# Patient Record
Sex: FEMALE | Race: WHITE | NOT HISPANIC OR LATINO | URBAN - METROPOLITAN AREA
[De-identification: names, ages, dates, MRNs, and addresses within clinical notes are randomized per-mention and may not be internally consistent; named-entity substitution may affect disease eponyms.]

---

## 2017-03-11 ENCOUNTER — SURGERY - HEALTHEAST (OUTPATIENT)
Dept: CARDIOLOGY | Facility: CLINIC | Age: 62
End: 2017-03-11

## 2017-03-11 ASSESSMENT — MIFFLIN-ST. JEOR: SCORE: 1170.92

## 2017-03-13 ASSESSMENT — MIFFLIN-ST. JEOR: SCORE: 1175

## 2017-03-15 ENCOUNTER — HOSPITAL ENCOUNTER (OUTPATIENT)
Dept: CT IMAGING | Facility: CLINIC | Age: 62
Discharge: HOME | End: 2017-03-15

## 2017-03-15 ENCOUNTER — RECORDS - HEALTHEAST (OUTPATIENT)
Dept: ADMINISTRATIVE | Facility: OTHER | Age: 62
End: 2017-03-15

## 2017-03-15 DIAGNOSIS — R61 NIGHT SWEATS: ICD-10-CM

## 2017-03-15 DIAGNOSIS — R06.02 SHORTNESS OF BREATH: ICD-10-CM

## 2017-03-16 ENCOUNTER — AMBULATORY - HEALTHEAST (OUTPATIENT)
Dept: CARDIAC REHAB | Facility: HOSPITAL | Age: 62
End: 2017-03-16

## 2017-03-16 DIAGNOSIS — Z95.5 STENTED CORONARY ARTERY: ICD-10-CM

## 2017-03-16 DIAGNOSIS — I21.02 ST ELEVATION MYOCARDIAL INFARCTION INVOLVING LEFT ANTERIOR DESCENDING (LAD) CORONARY ARTERY (H): ICD-10-CM

## 2017-03-24 ENCOUNTER — COMMUNICATION - HEALTHEAST (OUTPATIENT)
Dept: CARDIOLOGY | Facility: CLINIC | Age: 62
End: 2017-03-24

## 2017-03-29 ENCOUNTER — AMBULATORY - HEALTHEAST (OUTPATIENT)
Dept: CARDIOLOGY | Facility: CLINIC | Age: 62
End: 2017-03-29

## 2017-03-30 ENCOUNTER — OFFICE VISIT - HEALTHEAST (OUTPATIENT)
Dept: CARDIOLOGY | Facility: CLINIC | Age: 62
End: 2017-03-30

## 2017-03-30 DIAGNOSIS — E78.5 DYSLIPIDEMIA: ICD-10-CM

## 2017-03-30 DIAGNOSIS — I21.02 ACUTE ST ELEVATION MYOCARDIAL INFARCTION (STEMI) INVOLVING LEFT ANTERIOR DESCENDING (LAD) CORONARY ARTERY (H): ICD-10-CM

## 2017-03-30 DIAGNOSIS — I21.02 ST ELEVATION MYOCARDIAL INFARCTION INVOLVING LEFT ANTERIOR DESCENDING (LAD) CORONARY ARTERY (H): ICD-10-CM

## 2017-03-30 DIAGNOSIS — I10 BENIGN ESSENTIAL HTN: ICD-10-CM

## 2017-03-30 DIAGNOSIS — I25.10 CORONARY ARTERY DISEASE INVOLVING NATIVE CORONARY ARTERY OF NATIVE HEART WITHOUT ANGINA PECTORIS: ICD-10-CM

## 2017-03-30 ASSESSMENT — MIFFLIN-ST. JEOR: SCORE: 1171.82

## 2017-04-11 ENCOUNTER — COMMUNICATION - HEALTHEAST (OUTPATIENT)
Dept: CARDIOLOGY | Facility: CLINIC | Age: 62
End: 2017-04-11

## 2017-04-24 ENCOUNTER — COMMUNICATION - HEALTHEAST (OUTPATIENT)
Dept: CARDIOLOGY | Facility: CLINIC | Age: 62
End: 2017-04-24

## 2017-04-27 ENCOUNTER — OFFICE VISIT - HEALTHEAST (OUTPATIENT)
Dept: CARDIOLOGY | Facility: CLINIC | Age: 62
End: 2017-04-27

## 2017-04-27 DIAGNOSIS — I10 ESSENTIAL HYPERTENSION: ICD-10-CM

## 2017-04-27 DIAGNOSIS — I25.10 CORONARY ARTERY DISEASE INVOLVING NATIVE CORONARY ARTERY OF NATIVE HEART WITHOUT ANGINA PECTORIS: ICD-10-CM

## 2017-04-27 DIAGNOSIS — E78.5 DYSLIPIDEMIA: ICD-10-CM

## 2017-04-27 ASSESSMENT — MIFFLIN-ST. JEOR: SCORE: 1170.92

## 2017-06-05 ENCOUNTER — COMMUNICATION - HEALTHEAST (OUTPATIENT)
Dept: CARDIOLOGY | Facility: CLINIC | Age: 62
End: 2017-06-05

## 2017-06-09 ENCOUNTER — COMMUNICATION - HEALTHEAST (OUTPATIENT)
Dept: CARDIOLOGY | Facility: CLINIC | Age: 62
End: 2017-06-09

## 2017-06-09 DIAGNOSIS — I25.10 CAD (CORONARY ARTERY DISEASE): ICD-10-CM

## 2017-07-27 ENCOUNTER — COMMUNICATION - HEALTHEAST (OUTPATIENT)
Dept: CARDIOLOGY | Facility: CLINIC | Age: 62
End: 2017-07-27

## 2017-07-27 ENCOUNTER — AMBULATORY - HEALTHEAST (OUTPATIENT)
Dept: NURSING | Facility: CLINIC | Age: 62
End: 2017-07-27

## 2017-07-27 DIAGNOSIS — I10 ESSENTIAL HYPERTENSION: ICD-10-CM

## 2017-09-12 ENCOUNTER — AMBULATORY - HEALTHEAST (OUTPATIENT)
Dept: CARDIOLOGY | Facility: CLINIC | Age: 62
End: 2017-09-12

## 2017-09-14 ENCOUNTER — OFFICE VISIT - HEALTHEAST (OUTPATIENT)
Dept: CARDIOLOGY | Facility: CLINIC | Age: 62
End: 2017-09-14

## 2017-09-14 DIAGNOSIS — E78.5 DYSLIPIDEMIA: ICD-10-CM

## 2017-09-14 DIAGNOSIS — I25.10 CORONARY ARTERY DISEASE INVOLVING NATIVE CORONARY ARTERY OF NATIVE HEART WITHOUT ANGINA PECTORIS: ICD-10-CM

## 2017-09-14 DIAGNOSIS — I10 ESSENTIAL HYPERTENSION: ICD-10-CM

## 2017-10-26 ENCOUNTER — COMMUNICATION - HEALTHEAST (OUTPATIENT)
Dept: FAMILY MEDICINE | Facility: CLINIC | Age: 62
End: 2017-10-26

## 2017-11-01 ENCOUNTER — AMBULATORY - HEALTHEAST (OUTPATIENT)
Dept: LAB | Facility: HOSPITAL | Age: 62
End: 2017-11-01

## 2017-11-01 DIAGNOSIS — E78.5 DYSLIPIDEMIA: ICD-10-CM

## 2017-11-01 LAB
CHOLEST SERPL-MCNC: 185 MG/DL
FASTING STATUS PATIENT QL REPORTED: YES
HDLC SERPL-MCNC: 43 MG/DL
LDLC SERPL CALC-MCNC: 114 MG/DL
TRIGL SERPL-MCNC: 138 MG/DL

## 2017-11-06 ASSESSMENT — MIFFLIN-ST. JEOR: SCORE: 1188.15

## 2017-11-09 ENCOUNTER — SURGERY - HEALTHEAST (OUTPATIENT)
Dept: SURGERY | Facility: CLINIC | Age: 62
End: 2017-11-09

## 2017-11-09 ENCOUNTER — ANESTHESIA - HEALTHEAST (OUTPATIENT)
Dept: SURGERY | Facility: CLINIC | Age: 62
End: 2017-11-09

## 2017-11-09 ASSESSMENT — MIFFLIN-ST. JEOR: SCORE: 1159.01

## 2018-06-04 ENCOUNTER — COMMUNICATION - HEALTHEAST (OUTPATIENT)
Dept: CARDIOLOGY | Facility: CLINIC | Age: 63
End: 2018-06-04

## 2018-06-04 DIAGNOSIS — E78.5 DYSLIPIDEMIA: ICD-10-CM

## 2018-06-04 DIAGNOSIS — I25.10 CORONARY ARTERY DISEASE INVOLVING NATIVE CORONARY ARTERY OF NATIVE HEART WITHOUT ANGINA PECTORIS: ICD-10-CM

## 2018-06-04 DIAGNOSIS — I21.02 ST ELEVATION MYOCARDIAL INFARCTION INVOLVING LEFT ANTERIOR DESCENDING (LAD) CORONARY ARTERY (H): ICD-10-CM

## 2018-09-20 ENCOUNTER — COMMUNICATION - HEALTHEAST (OUTPATIENT)
Dept: ADMINISTRATIVE | Facility: CLINIC | Age: 63
End: 2018-09-20

## 2018-10-09 ENCOUNTER — RECORDS - HEALTHEAST (OUTPATIENT)
Dept: LAB | Facility: CLINIC | Age: 63
End: 2018-10-09

## 2018-10-09 LAB
ALBUMIN SERPL-MCNC: 3.7 G/DL (ref 3.5–5)
ALP SERPL-CCNC: 66 U/L (ref 45–120)
ALT SERPL W P-5'-P-CCNC: 31 U/L (ref 0–45)
ANION GAP SERPL CALCULATED.3IONS-SCNC: 6 MMOL/L (ref 5–18)
AST SERPL W P-5'-P-CCNC: 22 U/L (ref 0–40)
BILIRUB SERPL-MCNC: 0.6 MG/DL (ref 0–1)
BUN SERPL-MCNC: 12 MG/DL (ref 8–22)
CALCIUM SERPL-MCNC: 9.3 MG/DL (ref 8.5–10.5)
CHLORIDE BLD-SCNC: 108 MMOL/L (ref 98–107)
CHOLEST SERPL-MCNC: 143 MG/DL
CO2 SERPL-SCNC: 25 MMOL/L (ref 22–31)
CREAT SERPL-MCNC: 0.64 MG/DL (ref 0.6–1.1)
FASTING STATUS PATIENT QL REPORTED: NO
GFR SERPL CREATININE-BSD FRML MDRD: >60 ML/MIN/1.73M2
GLUCOSE BLD-MCNC: 85 MG/DL (ref 70–125)
HDLC SERPL-MCNC: 47 MG/DL
LDLC SERPL CALC-MCNC: 75 MG/DL
POTASSIUM BLD-SCNC: 4.7 MMOL/L (ref 3.5–5)
PROT SERPL-MCNC: 6.5 G/DL (ref 6–8)
SODIUM SERPL-SCNC: 139 MMOL/L (ref 136–145)
TRIGL SERPL-MCNC: 104 MG/DL

## 2018-11-26 ENCOUNTER — COMMUNICATION - HEALTHEAST (OUTPATIENT)
Dept: ADMINISTRATIVE | Facility: CLINIC | Age: 63
End: 2018-11-26

## 2019-04-03 ENCOUNTER — RECORDS - HEALTHEAST (OUTPATIENT)
Dept: LAB | Facility: CLINIC | Age: 64
End: 2019-04-03

## 2019-04-03 LAB
CHOLEST SERPL-MCNC: 125 MG/DL
FASTING STATUS PATIENT QL REPORTED: NO
HDLC SERPL-MCNC: 48 MG/DL
LDLC SERPL CALC-MCNC: 63 MG/DL
TRIGL SERPL-MCNC: 70 MG/DL

## 2019-07-01 ENCOUNTER — RECORDS - HEALTHEAST (OUTPATIENT)
Dept: LAB | Facility: CLINIC | Age: 64
End: 2019-07-01

## 2019-07-01 LAB
CHOLEST SERPL-MCNC: 193 MG/DL
FASTING STATUS PATIENT QL REPORTED: YES
HDLC SERPL-MCNC: 47 MG/DL
LDLC SERPL CALC-MCNC: 122 MG/DL
TRIGL SERPL-MCNC: 118 MG/DL

## 2019-07-02 ENCOUNTER — RECORDS - HEALTHEAST (OUTPATIENT)
Dept: LAB | Facility: CLINIC | Age: 64
End: 2019-07-02

## 2019-07-02 LAB
ANION GAP SERPL CALCULATED.3IONS-SCNC: 6 MMOL/L (ref 5–18)
BUN SERPL-MCNC: 12 MG/DL (ref 8–22)
CALCIUM SERPL-MCNC: 9.2 MG/DL (ref 8.5–10.5)
CHLORIDE BLD-SCNC: 108 MMOL/L (ref 98–107)
CO2 SERPL-SCNC: 27 MMOL/L (ref 22–31)
CREAT SERPL-MCNC: 0.67 MG/DL (ref 0.6–1.1)
GFR SERPL CREATININE-BSD FRML MDRD: >60 ML/MIN/1.73M2
GLUCOSE BLD-MCNC: 79 MG/DL (ref 70–125)
POTASSIUM BLD-SCNC: 4.5 MMOL/L (ref 3.5–5)
SODIUM SERPL-SCNC: 141 MMOL/L (ref 136–145)

## 2019-09-23 ENCOUNTER — RECORDS - HEALTHEAST (OUTPATIENT)
Dept: LAB | Facility: CLINIC | Age: 64
End: 2019-09-23

## 2019-09-23 LAB
CHOLEST SERPL-MCNC: 163 MG/DL
FASTING STATUS PATIENT QL REPORTED: YES
HDLC SERPL-MCNC: 51 MG/DL
LDLC SERPL CALC-MCNC: 90 MG/DL
TRIGL SERPL-MCNC: 112 MG/DL

## 2021-05-25 ENCOUNTER — RECORDS - HEALTHEAST (OUTPATIENT)
Dept: ADMINISTRATIVE | Facility: CLINIC | Age: 66
End: 2021-05-25

## 2021-05-26 ENCOUNTER — RECORDS - HEALTHEAST (OUTPATIENT)
Dept: ADMINISTRATIVE | Facility: CLINIC | Age: 66
End: 2021-05-26

## 2021-05-29 ENCOUNTER — RECORDS - HEALTHEAST (OUTPATIENT)
Dept: ADMINISTRATIVE | Facility: CLINIC | Age: 66
End: 2021-05-29

## 2021-05-30 ENCOUNTER — RECORDS - HEALTHEAST (OUTPATIENT)
Dept: ADMINISTRATIVE | Facility: CLINIC | Age: 66
End: 2021-05-30

## 2021-05-30 VITALS — WEIGHT: 140 LBS | BODY MASS INDEX: 23.3 KG/M2

## 2021-05-30 VITALS — WEIGHT: 140.9 LBS | HEIGHT: 65 IN | BODY MASS INDEX: 23.47 KG/M2

## 2021-05-30 VITALS — WEIGHT: 140.2 LBS | BODY MASS INDEX: 23.36 KG/M2 | HEIGHT: 65 IN

## 2021-05-30 VITALS — BODY MASS INDEX: 23.32 KG/M2 | HEIGHT: 65 IN | WEIGHT: 140 LBS

## 2021-05-31 VITALS — WEIGHT: 141 LBS | BODY MASS INDEX: 22.76 KG/M2

## 2021-05-31 VITALS — BODY MASS INDEX: 22.89 KG/M2 | WEIGHT: 137.38 LBS | HEIGHT: 65 IN

## 2021-06-09 NOTE — PROGRESS NOTES
Assessment/Plan:     1.  Coronary artery disease: She was hospitalized March 11 - March 13 with a STEMI.  Coronary angiogram showed 100% thrombotic lesion in mid LAD and moderate coronary artery disease elsewhere.  She received a drug-eluting stent to mid LAD.  Dual antiplatelet therapy is being used with aspirin indefinitely and ticagrelor for 1 year. We discussed the importance of antiplatelet therapy and talking with her cardiologist prior to stopping these medications for any reason.      Risk factor modification and lifestyle management topics were discussed including managing comorbidities, heart healthy diet and exercise.  We discussed the importance of cardiac rehab following MI and she will reconsider participating.    2.  Dyslipidemia: Marah Perkins is on atorvastatin 40 mg daily.  Her LDL is 95.  She states that she is sensitive to medications so will not increase Lipitor at this time.  If her LDL remains above 70, would recommend increasing Lipitor to 80 mg daily.  We discussed a diet low in saturated fat, weight loss, and exercise along with medication for better control of cholesterol.     3.  Hypertension: Her blood pressure is controlled today taking metoprolol succinate 25 mg daily.    Marah prefers to follow-up at the [facility] so will change cardiologists.    Subjective:     Marah Perkins is seen at [facility] for post coronary intervention follow up.  She was hospitalized March 11 - March 13 with a STEMI.  Coronary angiogram showed 100% thrombotic lesion in mid LAD and moderate coronary artery disease elsewhere.  She received a drug-eluting stent to mid LAD.  Dual antiplatelet therapy is being used with aspirin indefinitely and ticagrelor for 1 year.  Echocardiogram on March 11, 2017 showed an ejection fraction of 56%, mild to moderate tricuspid regurgitation, and mild to moderate mitral regurgitation.    She has occasional shortness of breath with activity.  She has mild  fatigue.  She denies lightheadedness, orthopnea, chest pain and lower extremity edema.  She is concerned about easy bruising.  She has a rectocele that was bleeding 2 weeks ago but has now improved.    She is walking and taking care of her horses most days denies any symptoms.  She states that cardiac rehab is not covered by her insurance.    Review of Systems:   General: WNL  Eyes: WNL  Ears/Nose/Throat: WNL  Lungs: Shortness of Breath  Heart: WNL  Stomach: WNL  Bladder: WNL  Muscle/Joints: WNL  Skin: Poor Wound Healing  Nervous System: WNL  Mental Health: Anxiety     Blood: Easy Bleeding, Easy Bruising     Patient Active Problem List   Diagnosis     Acute ST elevation myocardial infarction (STEMI) involving left anterior descending (LAD) coronary artery     Benign essential HTN     Coronary artery disease involving native coronary artery of native heart without angina pectoris     Dyslipidemia       No past medical history on file.    Past Surgical History:   Procedure Laterality Date     MA CATH PLACEMENT & NJX CORONARY ART ANGIO IMG S&I N/A 3/11/2017    Procedure: Coronary Angiogram;  Surgeon: Wes Zuluaga MD;  Location: [facility] Cath Lab;  Service: Cardiology     MA L HRT CATH W/NJX L VENTRICULOGRAPHY IMG S&I N/A 3/11/2017    Procedure: Left Heart Catheterization with Left Ventriculogram;  Surgeon: Wes Zuluaga MD;  Location: [facility] Cath Lab;  Service: Cardiology       No family history on file.    Social History     Social History     Marital status:      Spouse name: N/A     Number of children: N/A     Years of education: N/A     Occupational History     Not on file.     Social History Main Topics     Smoking status: Former Smoker     Smokeless tobacco: Not on file     Alcohol use No     Drug use: No     Sexual activity: Yes     Other Topics Concern     Not on file     Social History Narrative       Current Outpatient Prescriptions   Medication Sig Dispense Refill     aspirin 81 mg  chewable tablet Chew 1 tablet (81 mg total) daily.  0     atorvastatin (LIPITOR) 40 MG tablet Take 1 tablet (40 mg total) by mouth daily. 90 tablet 3     metoprolol succinate (TOPROL-XL) 25 MG Take 1 tablet (25 mg total) by mouth daily. 90 tablet 3     multivitamin therapeutic (THERAGRAN) tablet Take 1 tablet by mouth daily.       nitroglycerin (NITROSTAT) 0.4 MG SL tablet Place 1 tablet (0.4 mg total) under the tongue every 5 (five) minutes as needed for chest pain. 90 tablet 0     ticagrelor (BRILINTA) 90 mg Tab Take 1 tablet (90 mg total) by mouth 2 (two) times a day. 180 tablet 3     traMADol (ULTRAM) 50 mg tablet Take 50 mg by mouth every 6 (six) hours as needed for pain.       No current facility-administered medications for this visit.        Allergies   Allergen Reactions     Nitrofurantoin Macrocrystal Nausea Only     Sulfa (Sulfonamide Antibiotics) Rash     Mafenide Unknown       Objective:     Vitals:    03/30/17 1449   BP: 130/70   Pulse: 68   Temp: 98.1  F (36.7  C)   SpO2: 99%     Body mass index is 23.33 kg/(m^2).      General Appearance:   Alert, cooperative and in no acute distress.   HEENT:  No scleral icterus; the mucous membranes were pink and moist.   Chest: The spine was straight. The chest was symmetric.   Lungs:   Respirations unlabored; the lungs are clear to auscultation.   Cardiovascular:   Regular rhythm. S1 and S2 without murmur, clicks or rubs. Carotid pulses are intact and symmetrical.  No carotid bruits noted.   Abdomen:  Soft, nontender, nondistended, bowel sounds present   Extremities: No cyanosis, clubbing, or edema.   Skin: No xanthelasma.   Neurologic: Mood and affect are appropriate.   Puncture site:  she reports right femoral puncture site soft with no bruising Radial pulses and Pedal pulses intact and symmetrical.  CMS intact.         Lab Review   Lab Results   Component Value Date    CREATININE 0.66 03/13/2017    BUN 10 03/12/2017     03/12/2017    K 4.2 03/13/2017      (H) 03/12/2017    CO2 25 03/12/2017     Creatinine (mg/dL)   Date Value   03/13/2017 0.66   03/12/2017 0.64   03/11/2017 0.67   03/11/2017 0.73       Cardiographics  Echocardiogram 3/11/17:    1.Left ventricle ejection fraction is normal. The calculated left ventricular ejection fraction is 56%. Definite hypokinesis seen involving the distal septum and anterior apical wall.    2.Mild to moderate tricuspid and mild to moderate mitral regurgitation.    3.Mild pulmonary hypertension suggested.    4.No previous study for comparison.    40 minutes were spent with the patient with greater than 50% spent on education and counseling.      Yamilet Prieto, [facility]

## 2021-06-09 NOTE — PROGRESS NOTES
Cardiac Rehab Discharge Note - Pt attended initial session only. Pt was to call to set up a schedule and we had not heard from her in over a month with multiple attempts to contact. Pt completed a 6 minute walk on the first session at a 2.8 Met level without any symptoms. We will be discharging her from our care today (4.7.17).

## 2021-06-09 NOTE — PROGRESS NOTES
ITP ASSESSMENT   Assessment Day: Initial  Session Number: 1  Precautions: S/P MI  Diagnosis: Stent  Risk Stratification: Medium  Referring Provider: Dr. Cj Banks  EXERCISE  Exercise Assessment: Initial     6 Minute Walk Test   Pre   Pre Exercise HR: 81                  Pre Exercise BP: 133/79    Peak  Peak HR: 102                 Peak BP: 150/85  Peak feet: 1300  Peak O2 SAT: 98  Peak RPE: 11  Peak MPH: 2.46    Symptoms:  Peak Symptoms: mild SOB    5 mins. Post  5 Min Post HR: 86  5 Min Post BP: 136/80                         Exercise Plan  Goals Next 30 days  ADL'S: Resume going to barn/caring for horse  Leisure: Resume grocery shopping  Work: Resume normal laundry chores    Education Goals: Medication review  Education Goals Met: Patient can state cardiac s/s and appropriate emergency response.;Has system for taking medication.    Exercise Prescription  Exercise Mode: Treadmill;Bike;Nustep;Arm Erg.;Hallway Walking  Frequency: 2x/week  Duration: 30-40'  Intensity / THR: 20-30 beats above resting heart rate  RPE 11-14  Progression / Met level: 2.8-3.3  Resistive Training?: Yes    Current Exercise (mins/week): 1    Interventions  Home Exercise:  Mode: Walk, bike  Frequency: 2-3 days/week  Duration: 15-20'    Education Material : Educational videos;Provide written material;Individual education and counseling;Offer educational classes    Education Completed  Exercise Education Completed: Cardiac Anatomy;Signs and Symptoms;RPE;Emergency Plan;Home Exercise;Warm up/cool down;FITT Principles;BP/HR Reponse to exercise;Benefits of Exercise;End point of exercise            Exercise Follow-up/Discharge  Follow up/Discharge: Reviewed benefits of exercise, home exercise program, FITT, RPE, s/s of exercise intolerance, emergency plan, and how to use NTG NUTRITION  Nutrition Assessment: Initial    Nutrition Risk Factors:  Nutrition Risk Factors: Dyslipidemia  Cholesterol: 149  LDL: 95  HDL: 35  Triglycerides: 95    Nutrition  "Plan  Interventions  Nutrition Interventions: Diet consult;Diet class;Therapist/Patient discussion;Educational videos;Provide with written material    Education Completed  Nutrition Education Completed: Risk factor overview;Low sodium diet    Goals  Nutrition Goals (Next 30 days): Patient will follow a low sodium diet;Patient will follow a low saturated fat diet;Patient knows appropriate portion size    Goals Met  Nutrition Goals Met: Patient can identify their risk factors for CAD;Provided Rate your Plate Survey;Completed Nutritional Risk Screen;Reviewed Dietitian schedule    Height, Weight, and  BMI  Weight: 140 lb (63.5 kg)  Height: 5' 5\" (1.651 m)  BMI: 23.3    Nutrition Follow-up  Follow-up/Discharge: Instructed pt to complete diet habit survey. Will schedule 1:1 diet consult with RD       Other Risk Factors  Other Risk Factor Assessment: Initial    HTN Risk Factor: Hypertension    Pre Exercise BP: 133/79  Post Exercise BP: 136/80    Hypertension Plan  Goals  HTN Goals: Follow low sodium diet;Exercises regularly    Goals Met  HTN Goals Met: Take medication as prescribed    HTN Interventions  HTN Interventions: Diet consult;Therapist/patient discussion;Provide written material;Offer educational videos;Offer educational classes    HTN Education Completed  HTN Education Completed: Risk factor overview;Low sodium diet    Tobacco Risk Factor: NA    Risk Factor Follow-up   Follow-up/Discharge: Reviewed aspects of HTN mgmt including stress mgmt, medication compliance, regular exercise, low sodium diet. Reviewed effects of HTN on the heart   PSYCHOSOCIAL  Psychosocial Assessment: Initial     [facility] Q of L Summary Score: 26    ELIZABETH-D Score: 14    Psychosocial Risk Factor: Stress    Psychosocial Plan  Interventions  If ELIZABETH-D > 15 send letter to MD  Interventions: Offer educational videos and classes;Provide written material;Individual education and counseling;Offer Spiritual Care consult    Education " Completed  Education Completed: Relaxation/Coping Techniques;Effects of stress on body    Goals  Goals (Next 30 days): Identify stressors;Practicing stress management skills    Goals Met  Goals Met: Identified Support system;Oriented to stress management classes    Psychosocial Follow-up  Follow-up/Discharge: Pt has had a lot of major life changes in recent months causing increased levels of stress. Pt states emotional changes associated with dx of MI/Stent. Reviewed effects of stress on the heart and importance of stress management. Enc stress mgmt class and emotional aspects class. Offered support     Patient involved in Goal setting?: Yes    Signature: _____________________________________________________________    Date: __________________    Time: __________________

## 2021-06-09 NOTE — PROGRESS NOTES
Cardiac Rehab  Phase II Assessment    Assessment Date: 3/16/17    Diagnosis: STEMI  Date of Onset: 3/16/17  Procedure: ROOSEVELT x 1 to mid LAD  Date of Onset: 3/11/17  ICD/Pacemaker: No Parameters: NA  Post-op Complications: None  ECG History: SR, NSVT EF%:56  Past Medical History: GERD, neuropathy of hands and feet, HTN      Physical Assessment  Precautions/ Physical Limitations: neuropathy  Oxygen: No  O2 Sats: 98-99% Lung Sounds: Clear Edema: 0  Incisions: NA  Sleeping Pattern: good   Appetite: good   Nutrition Risk Screen: No risk at this time    Pain  Location:   Characteristics:  Intensity: (0-10 scale) 0  Current Pain Management:   Intervention:   Response:     Psychosocial/ Emotional Health  1. In the past 12 months, have you been in a relationship where you have been abused physically, emotionally, sexually or financially? No  notified: NA  2. Who do you turn to for emotional support?: , friends, [employer]  3. Do you have cultural or spiritual needs? No  4. Have there been any major life changes in the past 12 months? No    Referral Information  Primary Physician: Cj Banks MD  Cardiologist: GOLD  Surgeon:     Home exercise/Equipment: road bike    Patient's long-term goal(s): attend daughter's wedding, return to feeling healthy    1. Living Accommodations: Home Steps: Yes      Support people at home:    2. Marital Status:   3. Family is able to assist with cares      Rastafarian/Community involvement: involved in [employer] parish  4. Recreation/Hobbies: Riding horses

## 2021-06-12 NOTE — PROGRESS NOTES
I met with Marah Perkins at the request of Dr. Lennon to recheck her blood pressure.  Blood pressure medications on the MAR were reviewed with patient.    Patient has taken all medications as per usual regimen: Yes  Patient reports tolerating them without any issues or concerns: No and has headaches    Vitals:    07/27/17 1359 07/27/17 1422   BP: 168/88 (!) 152/92   Patient Site: Right Arm Right Arm   Patient Position: Sitting Sitting   Cuff Size: Adult Large Adult Large     After 5 minutes, the patient's blood pressure remained > 140/90.    Is the patient currently having any chest pain?  No  Does the patient currently have a headache?   Yes: Pain Description: Back of Neck  Pain Scale 6  Does the patient currently have any vision changes? No  Does the patient currently have any nausea? No  Does the patient currently have any abdominal pain? No    Information was reported to Dr. Jaron Moore.  Instructions received to increase [place] to 2x per day and continue to take B/P at home 1-2x per day and call results in to Dr. Lennon's office on Monday, 7/31/17 and reviewed with the patient.

## 2021-06-14 NOTE — ANESTHESIA PREPROCEDURE EVALUATION
Anesthesia Evaluation      Patient summary reviewed     Airway   Mallampati: II  Neck ROM: full   Pulmonary - negative ROS and normal exam    breath sounds clear to auscultation                         Cardiovascular - normal exam  Exercise tolerance: > or = 4 METS  (+) hypertension well controlled, CAD, CABG/stent, ,     Rhythm: regular  Rate: normal,         Neuro/Psych - negative ROS     Endo/Other - negative ROS      GI/Hepatic/Renal - negative ROS           Dental - normal exam                        Anesthesia Plan  Planned anesthetic: general endotracheal  Took antiplatelet med (Effient) on 11/3 - off 6 days and need to be off minimum of 7 thus will not offer a spina.  ASA 3   Induction: intravenous   Anesthetic plan and risks discussed with: patient    Post-op plan: routine recovery

## 2021-06-14 NOTE — ANESTHESIA CARE TRANSFER NOTE
Last vitals:   Vitals:    11/09/17 1253   BP: 175/82   Pulse: 76   Resp: 12   Temp: 36.7  C (98.1  F)   SpO2: 100%     Patient's level of consciousness is awake  Spontaneous respirations: yes  Maintains airway independently: yes  Dentition unchanged: yes  Oropharynx: oropharynx clear of all foreign objects    QCDR Measures:  ASA# 20 - Surgical Safety Checklist: WHO surgical safety checklist completed prior to induction  PQRS# 430 - Adult PONV Prevention: 4558F - Pt received => 2 anti-emetic agents (different classes) preop & intraop  ASA# 8 - Peds PONV Prevention: NA - Not pediatric patient, not GA or 2 or more risk factors NOT present  PQRS# 424 - Christina-op Temp Management: 4559F - At least one body temp DOCUMENTED => 35.5C or 95.9F within required timeframe  PQRS# 426 - PACU Transfer Protocol: - Transfer of care checklist used  ASA# 14 - Acute Post-op Pain: ASA14B - Patient did NOT experience pain >= 7 out of 10

## 2021-06-14 NOTE — ANESTHESIA POSTPROCEDURE EVALUATION
Patient: Marah Perkins  VAGINAL HYSTERECTOMY, ANTERIOR REPAIR & POSTERIOR REPAIR  Anesthesia type: general    Patient location: PACU  Last vitals:   Vitals:    11/09/17 1320   BP: 173/84   Pulse:    Resp: 20   Temp: 36.7  C (98  F)   SpO2:      Post vital signs: stable  Level of consciousness: awake and responds to simple questions  Post-anesthesia pain: pain controlled  Post-anesthesia nausea and vomiting: no  Pulmonary: unassisted, return to baseline  Cardiovascular: stable and blood pressure at baseline  Hydration: adequate  Anesthetic events: no    QCDR Measures:  ASA# 11 - Christina-op Cardiac Arrest: ASA11B - Patient did NOT experience unanticipated cardiac arrest  ASA# 12 - Christina-op Mortality Rate: ASA12B - Patient did NOT die  ASA# 13 - PACU Re-Intubation Rate: ASA13B - Patient did NOT require a new airway mgmt  ASA# 10 - Composite Anes Safety: ASA10A - No serious adverse event    Additional Notes:

## 2021-06-15 PROBLEM — E78.5 DYSLIPIDEMIA: Status: ACTIVE | Noted: 2017-03-30

## 2021-06-15 PROBLEM — I21.02 ACUTE ST ELEVATION MYOCARDIAL INFARCTION (STEMI) INVOLVING LEFT ANTERIOR DESCENDING (LAD) CORONARY ARTERY (H): Status: ACTIVE | Noted: 2017-03-11

## 2021-06-19 ENCOUNTER — HEALTH MAINTENANCE LETTER (OUTPATIENT)
Age: 66
End: 2021-06-19

## 2021-06-25 NOTE — PROGRESS NOTES
Progress Notes by Jay Lennon MD at 9/14/2017  4:00 PM     Author: Jay Lennon MD Service: -- Author Type: Physician    Filed: 9/14/2017  4:27 PM Encounter Date: 9/14/2017 Status: Signed    : Jay Lennon MD (Physician)                  [email or web address]           Thank you Dr. Banks for asking the [facility] team to participate in the care of your patient, Marah Perkins.     Impression and Plan     1.  Coronary artery disease.  The Marah has known coronary artery disease having presented with an acute anterior ST elevation myocardial infarction 11 March 2017.  Patient underwent successful PCI with stent placement to mid left anterior descending coronary (2.75×20 mm Synergy? bioabsorbable polymer everolimus coated stent). Echocardiography 11 March 2017 revealed well-preserved LV systolic performance with ejection fraction of 55-60%.     Patient  she is doing well and this regard.  She denies any chest pain, shortness of breath, or decline in exercise tolerance.     2.  Hypertension.  Blood pressure is fairly reasonable only office today.     3.  Dyslipidemia.  Patient on her own volition discontinued her statin therapy.  Plan to obtain a fasting lipid profile off therapy and will follow-up the results accordingly.     Plan on follow-up in one year.         History of Present Illness    Once again I would like to thank you again for asking me to participate in the care of your patient, Marah Perkins.  As you know, but to reiterate for my own records, Marah Perkins is a 62 y.o. female with known coronary artery disease having presented with an acute anterior ST elevation myocardial infarction 11 March 2017.  Patient underwent successful PCI with stent placement to mid left anterior descending coronary (2.75×20 mm Synergy? bioabsorbable polymer everolimus coated stent).     On interview today, Marah reports that since her revascularization procedure she  has been doing well from a cardiovascular standpoint. She specifically denies any chest pain or worsening shortness of breath.  She reports no palpitations or lightheadedness.      Further review of systems is otherwise negative/noncontributory (medical record and 13 point review of systems reviewed as well and pertinent positives noted).         Cardiac Diagnostics      Coronary angiography 11 March 2017:  1. Single-vessel coronary artery disease with acute thrombotic occlusion of mid left anterior descending coronary artery.  2. Successful PCI with stent placement of mid left anterior descending coronary (2.75×20 mm Synergy? bioabsorbable polymer everolimus coated stent).    Recommendations:    DAPT x 1 year    ECHO today - recommend using contrast to exclude apical thrombus     Moderate-high intensity statin    BB and ARB    Ongoing aggressive risk factor modification    Echocardiogram 11 March 2017:  1. Normal left ventricular size and systolic performance with ejection fraction of 55-60%.  2. Distal septal and anteroapical hypokinesis.  3. Mild to moderate mitral insufficiency.  4. Mild to moderate tricuspid insufficiency.  5. Normal right ventricular size and systolic performance.  6. Normal atrial dimensions.  7. Right ventricular systolic pressure relative to right atrial pressure is mildly increased.  Pulmonary artery pressures estimated at 35-40 mmHg plus right atrial pressure.            Physical Examination       /82  Pulse 80  Resp 16  Wt 141 lb (64 kg)  Breastfeeding? No  BMI 22.76 kg/m2        Wt Readings from Last 3 Encounters:   09/14/17 141 lb (64 kg)   08/05/17 139 lb (63 kg)   04/27/17 140 lb (63.5 kg)     The patient is alert and oriented times three. Sclerae are anicteric. Mucosal membranes are moist. Jugular venous pressure is normal. No significant adenopathy/thyromegally appreciated. Lungs are clear with good expansion. On cardiovascular exam, the patient has a regular S1 and S2.  Abdomen is soft and non-tender. Extremities reveal no clubbing, cyanosis, or edema.         Family History/Social History/Risk Factors   Patient does not smoke.  Family history of hypertension.         Medications  Allergies   Current Outpatient Prescriptions   Medication Sig Dispense Refill   ? losartan (COZAAR) 25 MG tablet Take 2 tablets (50 mg total) by mouth 2 (two) times a day. 240 tablet 3   ? multivitamin therapeutic (THERAGRAN) tablet Take 1 tablet by mouth daily.     ? nitroglycerin (NITROSTAT) 0.4 MG SL tablet Place 1 tablet (0.4 mg total) under the tongue every 5 (five) minutes as needed for chest pain. 90 tablet 0   ? prasugrel (EFFIENT) 10 mg Tab tablet Take 1 tablet (10 mg total) by mouth daily. (Patient taking differently: Take 5 mg by mouth 2 (two) times a day. ) 30 tablet 11     No current facility-administered medications for this visit.       Allergies   Allergen Reactions   ? Metoprolol Shortness Of Breath and Other (See Comments)     Weakness and fatigue   ? Nitrofurantoin Macrocrystal Nausea Only   ? Sulfa (Sulfonamide Antibiotics) Rash   ? Amlodipine Swelling and Myalgia   ? Avapro [Irbesartan] Other (See Comments)     Malaise    ? Benicar [Olmesartan] Nausea Only   ? Lisinopril Cough   ? Mafenide Unknown   ? Micardis [Telmisartan] Other (See Comments)     Innefective   ? Plavix [Clopidogrel] Itching   ? Ticagrelor      bruising          Lab Results   Lab Results   Component Value Date     03/12/2017    K 4.2 03/13/2017     (H) 03/12/2017    CO2 25 03/12/2017    BUN 10 03/12/2017    CREATININE 0.66 03/13/2017    CALCIUM 8.3 (L) 03/12/2017     Lab Results   Component Value Date    WBC 8.3 03/11/2017    HGB 13.7 03/12/2017    HCT 46.1 03/11/2017    MCV 90 03/11/2017     03/11/2017     Lab Results   Component Value Date    CHOL 149 03/12/2017    TRIG 95 03/12/2017    HDL 35 (L) 03/12/2017    LDLCALC 95 03/12/2017     Lab Results   Component Value Date    INR 0.97 03/11/2017      Lab Results   Component Value Date    TROPONINI 0.05 03/11/2017    TROPONINI 0.05 03/11/2017

## 2021-06-25 NOTE — PROGRESS NOTES
Progress Notes by Jay Lennon MD at 4/27/2017  3:00 PM     Author: Jay Lennon MD Service: -- Author Type: Physician    Filed: 4/27/2017  3:14 PM Encounter Date: 4/27/2017 Status: Signed    : Jay Lennon MD (Physician)                  [email or web address]           Thank you Dr. Banks for asking the [facility] team to participate in the care of your patient, Marah Perkins.     Impression and Plan     1.  Coronary artery disease.  The Marah has known coronary artery disease. having presented with an acute anterior ST elevation myocardial infarction 11 March 2017.  Patient underwent successful PCI with stent placement to mid left anterior descending coronary (2.75×20 mm Synergy drug-eluting stent).   Echocardiography 11 March 2017 revealed well-preserved LV systolic performance with ejection fraction of 55-60%.    Patient has been maintained on ticagrelor and aspirin.  Patient reports that the ticagrelor  is financially quuite burdensome for her.  We could try clopidogrel as an alternative antiplatelet therapy in place of ticagrelor.  A loading dose of clopidogrel 300 mg is generally advisable in the presence of high risk of coronary thrombosis due to the quick offset of ticagrelor  and this was discussed with and advised to Marah.    2.  Hypertension.   As noted below, Chiquis does report subjective fatigue and some somnolence as well as perhaps some very subtle depression on the beta-blocker therapy.  Plan:    Discontinue beta-blocker therapy.    Initiate losartan 25 mg daily (of note, patient does have a history of ACE inhibitor induced cough).    3.  Dyslipidemia. Lipid profile 12 March 2017 revealed LDL 95 mg/dL and HDL 35 mg/dL.  Patient is newly started on atorvastatin.  Recommend:    Repeat lipid profile in approximately 2 months.    Plan on follow-up in six months.            History of Present Illness    Once again I would like to thank you again  for asking me to participate in the care of your patient, Marah Perkins.  As you know, but to reiterate for my own records, Marah Perkins is a 61 y.o. female with known coronary artery disease having presented with an acute anterior ST elevation myocardial infarction 11 March 2017.  Patient underwent successful PCI with stent placement to mid left anterior descending coronary (2.75×20 mm Synergy drug-eluting stent).    On interview  today, Marah reports that since her revascularization procedure she has been doing well from a cardiovascular standpoint. She specifically denies any chest pain or worsening shortness of breath.  She reports no palpitations or lightheadedness.  She does, however, indicate that she has some increasing fatigue and daytime somnolence which she thinks is related to the beta-blocker therapy.  She also thinks that this may have had some affect on her mood and that she feels perhaps just slightly depressed on the medication.    Further review of systems is otherwise negative/noncontributory (medical record and 13 point review of systems reviewed as well and pertinent positives noted).         Cardiac Diagnostics      Coronary angiography 11 March 2017:  1. Single-vessel coronary artery disease with acute thrombotic occlusion of mid left anterior descending coronary artery.  2. Successful PCI with stent placement of mid left anterior descending coronary (2.75×20 mm Synergy drug-eluting stent).    Echocardiogram 11 March 2017:  1. Normal left ventricular size and systolic performance with ejection fraction of 55-60%.  2. Distal septal and anteroapical hypokinesis.  3. Mild to moderate mitral insufficiency.  4. Mild to moderate tricuspid insufficiency.  5. Normal right ventricular size and systolic performance.  6. Normal atrial dimensions.  7. Right ventricular systolic pressure relative to right atrial pressure is mildly increased.  Pulmonary artery pressures estimated at 35-40 mmHg plus  "right atrial pressure.           Physical Examination       /80 (Patient Site: Left Arm, Patient Position: Sitting, Cuff Size: Adult Regular)  Pulse 60  Ht 5' 5\" (1.651 m)  Wt 140 lb (63.5 kg)  Breastfeeding? No  BMI 23.3 kg/m2        Wt Readings from Last 3 Encounters:   04/27/17 140 lb (63.5 kg)   03/30/17 140 lb 3.2 oz (63.6 kg)   03/16/17 140 lb (63.5 kg)     The patient is alert and oriented times three. Sclerae are anicteric. Mucosal membranes are moist. Jugular venous pressure is normal. No significant adenopathy/thyromegally appreciated. Lungs are clear with good expansion. On cardiovascular exam, the patient has a regular S1 and S2. Abdomen is soft and non-tender. Extremities reveal no clubbing, cyanosis, or edema.         Family History/Social History/Risk Factors   Patient does not smoke.  Family history of hypertension.         Medications  Allergies   Current Outpatient Prescriptions   Medication Sig Dispense Refill   ? aspirin 81 mg chewable tablet Chew 1 tablet (81 mg total) daily.  0   ? atorvastatin (LIPITOR) 40 MG tablet Take 1 tablet (40 mg total) by mouth daily. 90 tablet 3   ? multivitamin therapeutic (THERAGRAN) tablet Take 1 tablet by mouth daily.     ? nitroglycerin (NITROSTAT) 0.4 MG SL tablet Place 1 tablet (0.4 mg total) under the tongue every 5 (five) minutes as needed for chest pain. 90 tablet 0   ? clopidogrel (PLAVIX) 75 mg tablet Take 1 tablet (75 mg total) by mouth daily. 90 tablet 3   ? losartan (COZAAR) 25 MG tablet Take 1 tablet (25 mg total) by mouth daily. 90 tablet 3     No current facility-administered medications for this visit.       Allergies   Allergen Reactions   ? Nitrofurantoin Macrocrystal Nausea Only   ? Sulfa (Sulfonamide Antibiotics) Rash   ? Mafenide Unknown          Lab Results   Lab Results   Component Value Date     03/12/2017    K 4.2 03/13/2017     (H) 03/12/2017    CO2 25 03/12/2017    BUN 10 03/12/2017    CREATININE 0.66 03/13/2017    " CALCIUM 8.3 (L) 03/12/2017     Lab Results   Component Value Date    WBC 8.3 03/11/2017    HGB 13.7 03/12/2017    HCT 46.1 03/11/2017    MCV 90 03/11/2017     03/11/2017     Lab Results   Component Value Date    CHOL 149 03/12/2017    TRIG 95 03/12/2017    HDL 35 (L) 03/12/2017    LDLCALC 95 03/12/2017     Lab Results   Component Value Date    INR 0.97 03/11/2017     Lab Results   Component Value Date    TROPONINI 0.05 03/11/2017    TROPONINI 0.05 03/11/2017

## 2021-07-21 ENCOUNTER — RECORDS - HEALTHEAST (OUTPATIENT)
Dept: ADMINISTRATIVE | Facility: CLINIC | Age: 66
End: 2021-07-21

## 2021-10-04 ENCOUNTER — HEALTH MAINTENANCE LETTER (OUTPATIENT)
Age: 66
End: 2021-10-04

## 2022-07-10 ENCOUNTER — HEALTH MAINTENANCE LETTER (OUTPATIENT)
Age: 67
End: 2022-07-10

## 2022-09-11 ENCOUNTER — HEALTH MAINTENANCE LETTER (OUTPATIENT)
Age: 67
End: 2022-09-11

## 2023-07-29 ENCOUNTER — HEALTH MAINTENANCE LETTER (OUTPATIENT)
Age: 68
End: 2023-07-29

## 2025-05-06 ENCOUNTER — TRANSCRIBE ORDERS (OUTPATIENT)
Dept: OTHER | Age: 70
End: 2025-05-06

## 2025-05-06 DIAGNOSIS — I25.10 CAD (CORONARY ARTERY DISEASE): Primary | ICD-10-CM

## 2025-05-06 DIAGNOSIS — Z98.61 S/P PTCA (PERCUTANEOUS TRANSLUMINAL CORONARY ANGIOPLASTY): ICD-10-CM

## 2025-05-06 DIAGNOSIS — I25.10 ASCVD (ARTERIOSCLEROTIC CARDIOVASCULAR DISEASE): Primary | ICD-10-CM

## 2025-05-14 ENCOUNTER — HOSPITAL ENCOUNTER (OUTPATIENT)
Dept: CARDIAC REHAB | Facility: HOSPITAL | Age: 70
Discharge: HOME | End: 2025-05-14
Attending: PHYSICIAN ASSISTANT
Payer: MEDICARE

## 2025-05-14 DIAGNOSIS — I25.10 ASCVD (ARTERIOSCLEROTIC CARDIOVASCULAR DISEASE): ICD-10-CM

## 2025-05-14 PROCEDURE — 93798 PHYS/QHP OP CAR RHAB W/ECG: CPT

## 2025-05-14 PROCEDURE — 93797 PHYS/QHP OP CAR RHAB WO ECG: CPT

## 2025-05-17 ENCOUNTER — HEALTH MAINTENANCE LETTER (OUTPATIENT)
Age: 70
End: 2025-05-17

## 2025-05-20 ENCOUNTER — HOSPITAL ENCOUNTER (OUTPATIENT)
Dept: CARDIAC REHAB | Facility: HOSPITAL | Age: 70
Discharge: HOME | End: 2025-05-20
Attending: INTERNAL MEDICINE
Payer: MEDICARE

## 2025-05-20 PROCEDURE — 93798 PHYS/QHP OP CAR RHAB W/ECG: CPT

## 2025-05-27 ENCOUNTER — HOSPITAL ENCOUNTER (OUTPATIENT)
Dept: CARDIAC REHAB | Facility: HOSPITAL | Age: 70
Discharge: HOME | End: 2025-05-27
Attending: INTERNAL MEDICINE
Payer: MEDICARE

## 2025-05-27 PROCEDURE — 93798 PHYS/QHP OP CAR RHAB W/ECG: CPT

## 2025-06-02 ENCOUNTER — HOSPITAL ENCOUNTER (OUTPATIENT)
Dept: CARDIAC REHAB | Facility: HOSPITAL | Age: 70
Discharge: HOME | End: 2025-06-02
Attending: INTERNAL MEDICINE
Payer: MEDICARE

## 2025-06-02 PROCEDURE — 93798 PHYS/QHP OP CAR RHAB W/ECG: CPT

## 2025-06-10 ENCOUNTER — HOSPITAL ENCOUNTER (OUTPATIENT)
Dept: CARDIAC REHAB | Facility: HOSPITAL | Age: 70
Discharge: HOME | End: 2025-06-10
Attending: INTERNAL MEDICINE
Payer: MEDICARE

## 2025-06-10 PROCEDURE — 93798 PHYS/QHP OP CAR RHAB W/ECG: CPT

## 2025-06-16 ENCOUNTER — HOSPITAL ENCOUNTER (OUTPATIENT)
Dept: CARDIAC REHAB | Facility: HOSPITAL | Age: 70
Discharge: HOME | End: 2025-06-16
Attending: INTERNAL MEDICINE
Payer: MEDICARE

## 2025-06-16 PROCEDURE — 93798 PHYS/QHP OP CAR RHAB W/ECG: CPT

## 2025-06-24 ENCOUNTER — HOSPITAL ENCOUNTER (OUTPATIENT)
Dept: CARDIAC REHAB | Facility: HOSPITAL | Age: 70
Discharge: HOME | End: 2025-06-24
Attending: INTERNAL MEDICINE
Payer: MEDICARE

## 2025-06-24 PROCEDURE — 93798 PHYS/QHP OP CAR RHAB W/ECG: CPT

## 2025-07-01 ENCOUNTER — HOSPITAL ENCOUNTER (OUTPATIENT)
Dept: CARDIAC REHAB | Facility: HOSPITAL | Age: 70
Discharge: HOME | End: 2025-07-01
Attending: INTERNAL MEDICINE
Payer: MEDICARE

## 2025-07-01 PROCEDURE — 93798 PHYS/QHP OP CAR RHAB W/ECG: CPT

## 2025-07-08 ENCOUNTER — HOSPITAL ENCOUNTER (OUTPATIENT)
Dept: CARDIAC REHAB | Facility: HOSPITAL | Age: 70
Discharge: HOME | End: 2025-07-08
Attending: INTERNAL MEDICINE
Payer: MEDICARE

## 2025-07-08 PROCEDURE — 93798 PHYS/QHP OP CAR RHAB W/ECG: CPT
